# Patient Record
Sex: MALE | Race: BLACK OR AFRICAN AMERICAN | NOT HISPANIC OR LATINO | Employment: FULL TIME | URBAN - METROPOLITAN AREA
[De-identification: names, ages, dates, MRNs, and addresses within clinical notes are randomized per-mention and may not be internally consistent; named-entity substitution may affect disease eponyms.]

---

## 2019-08-02 ENCOUNTER — HOSPITAL ENCOUNTER (EMERGENCY)
Facility: OTHER | Age: 49
Discharge: HOME | End: 2019-08-02
Attending: EMERGENCY MEDICINE
Payer: MEDICAID

## 2019-08-02 VITALS
TEMPERATURE: 99 F | WEIGHT: 150 LBS | DIASTOLIC BLOOD PRESSURE: 76 MMHG | HEART RATE: 82 BPM | OXYGEN SATURATION: 98 % | RESPIRATION RATE: 18 BRPM | HEIGHT: 70 IN | BODY MASS INDEX: 21.47 KG/M2 | SYSTOLIC BLOOD PRESSURE: 132 MMHG

## 2019-08-02 DIAGNOSIS — S39.012A STRAIN OF LUMBAR REGION, INITIAL ENCOUNTER: Primary | ICD-10-CM

## 2019-08-02 PROCEDURE — 25000003 PHARM REV CODE 250: Performed by: PHYSICIAN ASSISTANT

## 2019-08-02 PROCEDURE — 99284 EMERGENCY DEPT VISIT MOD MDM: CPT | Mod: 25

## 2019-08-02 PROCEDURE — 96372 THER/PROPH/DIAG INJ SC/IM: CPT

## 2019-08-02 PROCEDURE — 63600175 PHARM REV CODE 636 W HCPCS: Performed by: PHYSICIAN ASSISTANT

## 2019-08-02 RX ORDER — IBUPROFEN 600 MG/1
600 TABLET ORAL EVERY 6 HOURS PRN
Qty: 20 TABLET | Refills: 0 | Status: SHIPPED | OUTPATIENT
Start: 2019-08-02

## 2019-08-02 RX ORDER — METHOCARBAMOL 500 MG/1
1000 TABLET, FILM COATED ORAL 3 TIMES DAILY
Qty: 30 TABLET | Refills: 0 | Status: SHIPPED | OUTPATIENT
Start: 2019-08-02 | End: 2019-08-07

## 2019-08-02 RX ORDER — METHOCARBAMOL 500 MG/1
500 TABLET, FILM COATED ORAL
Status: COMPLETED | OUTPATIENT
Start: 2019-08-02 | End: 2019-08-02

## 2019-08-02 RX ORDER — KETOROLAC TROMETHAMINE 30 MG/ML
30 INJECTION, SOLUTION INTRAMUSCULAR; INTRAVENOUS
Status: COMPLETED | OUTPATIENT
Start: 2019-08-02 | End: 2019-08-02

## 2019-08-02 RX ADMIN — KETOROLAC TROMETHAMINE 30 MG: 30 INJECTION, SOLUTION INTRAMUSCULAR at 10:08

## 2019-08-02 RX ADMIN — METHOCARBAMOL 500 MG: 500 TABLET, FILM COATED ORAL at 10:08

## 2019-08-03 NOTE — ED PROVIDER NOTES
"Encounter Date: 8/2/2019       History     Chief Complaint   Patient presents with    Back Pain     "I think I tweaked something" Pt CO LLQ back pain since "rolling paint" aprox 5 hours ago.      Patient is a 48 y.o. male presenting to the emergency department for evaluation of left low back pain. 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  He states that he may have reached up too high.  He states that since onset, he has noticed pain in his left low back that worsens with movement.  He denies any fall or injury. He denies any numbness, tingling, weakness of his upper lower extremities bilaterally.  He states the pain is best when he stands.  He denies any loss of urinary bowel control.  He has not taken any medication for symptoms thus far.  No fever chills.This is the extent of the patient's complaints at this time.       The history is provided by the patient.     Review of patient's allergies indicates:  No Known Allergies  History reviewed. No pertinent past medical history.  History reviewed. No pertinent surgical history.  History reviewed. No pertinent family history.  Social History     Tobacco Use    Smoking status: Never Smoker   Substance Use Topics    Alcohol use: Never     Frequency: Never    Drug use: Never     Review of Systems   Constitutional: Negative for activity change, chills, fatigue and fever.   HENT: Negative for congestion, rhinorrhea and sore throat.    Eyes: Negative for photophobia and visual disturbance.   Respiratory: Negative for cough and shortness of breath.    Cardiovascular: Negative for chest pain.   Gastrointestinal: Negative for abdominal pain, diarrhea, nausea and vomiting.   Genitourinary: Negative for dysuria, hematuria and urgency.   Musculoskeletal: Positive for back pain and myalgias. Negative for neck pain.   Skin: Negative for color change and wound.   Neurological: Negative for weakness and headaches. "   Psychiatric/Behavioral: Negative for agitation and confusion.       Physical Exam     Initial Vitals [08/02/19 2208]   BP Pulse Resp Temp SpO2   132/76 82 18 99.3 °F (37.4 °C) 98 %      MAP       --         Physical Exam    Nursing note and vitals reviewed.  Constitutional: Vital signs are normal. He appears well-developed and well-nourished. He is not diaphoretic.  Non-toxic appearance. He does not have a sickly appearance. No distress.   Well-appearing,  male accompanied the emergency room.  Speaking clearly in full sentences.  No acute distress.   HENT:   Head: Normocephalic and atraumatic.   Right Ear: External ear normal.   Left Ear: External ear normal.   Nose: Nose normal.   Mouth/Throat: Oropharynx is clear and moist.   Eyes: Conjunctivae and EOM are normal.   Neck: Normal range of motion. Neck supple.   Musculoskeletal: Normal range of motion.   Tenderness to palpation of the left-sided paraspinal muscles of the lumbar spine with no midline tenderness, crepitus, step-off.  Normal range of motion, strength, sensation.  Ambulatory and weight-bearing.   Neurological: He is alert and oriented to person, place, and time. He has normal strength. GCS eye subscore is 4. GCS verbal subscore is 5. GCS motor subscore is 6.   Skin: Skin is warm.   Psychiatric: He has a normal mood and affect. His behavior is normal. Judgment and thought content normal.         ED Course   Procedures  Labs Reviewed - No data to display          Medical Decision Making:   Initial Assessment:     Urgent evaluation of a 48-year-old male presenting to the emergency room for evaluation of low back pain.  Patient is afebrile, nontoxic appearing and hemodynamically stable. Physical exam reveals tenderness to palpation of the left-sided paraspinal muscles lumbar spine with no midline tenderness, crepitus, step-off. There are no signs of saddle anesthesia, incontinence, neurologic deficits, fevers, trauma or midline tenderness  on history or physical to suggest cauda equina, infectious process, fracture or subluxation.     ED Management:    No history of injury or trauma, no midline tenderness.  I do not feel imaging is warranted.  Signs symptoms likely secondary to musculoskeletal etiology.  Patient was given analgesics in the ED will be discharged home with a prescription for Robaxin and ibuprofen.  He is counseled extensively on home care treatment.  Also given an excuse to abstain from work for couple days.  Discharged home in stable condition.The patient was instructed to follow up with a primary care provider in 2 days or to return to the emergency department for worsening symptoms. The treatment plan was discussed with the patient who demonstrated understanding and comfort with plan.    This note was created using [employer] Direct. There may be typographical errors secondary to dictation.                         Clinical Impression:     1. Strain of lumbar region, initial encounter         Disposition:   Disposition: Discharged  Condition: Stable                        Carolina Stoll PA-C  08/03/19 0027

## 2019-08-03 NOTE — ED PROVIDER NOTES
"Encounter Date: 8/2/2019          History     Chief Complaint   Patient presents with    Back Pain     "I think I tweaked something" Pt CO LLQ back pain since "rolling paint" aprox 5 hours ago.      HPI  Review of patient's allergies indicates:  No Known Allergies  History reviewed. No pertinent past medical history.  History reviewed. No pertinent surgical history.  History reviewed. No pertinent family history.  Social History     Tobacco Use    Smoking status: Never Smoker   Substance Use Topics    Alcohol use: Never     Frequency: Never    Drug use: Never     Review of Systems    Physical Exam     Initial Vitals [08/02/19 2208]   BP Pulse Resp Temp SpO2   132/76 82 18 99.3 °F (37.4 °C) 98 %      MAP       --         Physical Exam    ED Course   Procedures  Labs Reviewed - No data to display       Imaging Results    None                               Clinical Impression:     1. Strain of lumbar region, initial encounter                                "

## 2019-08-03 NOTE — ED TRIAGE NOTES
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  Pt denies heavy lifting but states he has repetitive movements while at work daily.  Pt states he is unable to sit because the pain is so bad.  Pt denies chest pain, sob, n/v/diarrhea.  "